# Patient Record
Sex: MALE | Race: BLACK OR AFRICAN AMERICAN
[De-identification: names, ages, dates, MRNs, and addresses within clinical notes are randomized per-mention and may not be internally consistent; named-entity substitution may affect disease eponyms.]

---

## 2018-10-06 ENCOUNTER — HOSPITAL ENCOUNTER (EMERGENCY)
Dept: HOSPITAL 9 - MADERS | Age: 28
Discharge: HOME | End: 2018-10-06
Payer: SELF-PAY

## 2018-10-06 DIAGNOSIS — J02.9: Primary | ICD-10-CM

## 2018-10-06 DIAGNOSIS — F17.210: ICD-10-CM

## 2018-10-06 PROCEDURE — 99406 BEHAV CHNG SMOKING 3-10 MIN: CPT

## 2018-10-06 PROCEDURE — 87430 STREP A AG IA: CPT

## 2018-10-06 PROCEDURE — 87081 CULTURE SCREEN ONLY: CPT

## 2019-07-25 ENCOUNTER — HOSPITAL ENCOUNTER (EMERGENCY)
Dept: HOSPITAL 9 - MADERS | Age: 29
LOS: 1 days | Discharge: HOME | End: 2019-07-26
Payer: SELF-PAY

## 2019-07-25 DIAGNOSIS — J20.9: Primary | ICD-10-CM

## 2019-07-25 DIAGNOSIS — F17.210: ICD-10-CM

## 2019-07-25 PROCEDURE — 71046 X-RAY EXAM CHEST 2 VIEWS: CPT

## 2019-07-26 NOTE — RAD
XR Chest Pa   Lat STANDARD



HISTORY: Fever cough



COMPARISON: 7/15/2008



FINDINGS: The heart size is normal. The lungs are well expanded without focal areas of consolidation,
 pneumothorax or pleural effusions.



IMPRESSION: No radiographic evidence of acute cardiopulmonary process.



Reported By: Juancarlos Gardner 

Electronically Signed:  7/26/2019 7:43 AM

## 2019-09-17 ENCOUNTER — HOSPITAL ENCOUNTER (EMERGENCY)
Dept: HOSPITAL 9 - MADERS | Age: 29
Discharge: HOME | End: 2019-09-17
Payer: SELF-PAY

## 2019-09-17 DIAGNOSIS — T78.40XA: Primary | ICD-10-CM

## 2019-09-17 DIAGNOSIS — F17.210: ICD-10-CM

## 2019-09-17 PROCEDURE — 96372 THER/PROPH/DIAG INJ SC/IM: CPT

## 2019-09-17 PROCEDURE — 96374 THER/PROPH/DIAG INJ IV PUSH: CPT

## 2019-12-31 ENCOUNTER — HOSPITAL ENCOUNTER (EMERGENCY)
Dept: HOSPITAL 9 - MADERS | Age: 29
LOS: 1 days | Discharge: HOME | End: 2020-01-01
Payer: SELF-PAY

## 2019-12-31 DIAGNOSIS — J20.9: Primary | ICD-10-CM

## 2019-12-31 DIAGNOSIS — Z71.6: ICD-10-CM

## 2019-12-31 DIAGNOSIS — F17.210: ICD-10-CM

## 2019-12-31 PROCEDURE — 99406 BEHAV CHNG SMOKING 3-10 MIN: CPT

## 2020-01-19 ENCOUNTER — HOSPITAL ENCOUNTER (EMERGENCY)
Dept: HOSPITAL 9 - MADERS | Age: 30
Discharge: HOME | End: 2020-01-19
Payer: SELF-PAY

## 2020-01-19 DIAGNOSIS — J45.901: Primary | ICD-10-CM

## 2020-03-18 ENCOUNTER — HOSPITAL ENCOUNTER (EMERGENCY)
Dept: HOSPITAL 9 - MADERS | Age: 30
Discharge: HOME | End: 2020-03-18
Payer: SELF-PAY

## 2020-03-18 DIAGNOSIS — F17.200: ICD-10-CM

## 2020-03-18 DIAGNOSIS — J45.901: Primary | ICD-10-CM

## 2020-03-18 DIAGNOSIS — Z79.51: ICD-10-CM

## 2020-04-26 ENCOUNTER — HOSPITAL ENCOUNTER (EMERGENCY)
Dept: HOSPITAL 9 - MADERS | Age: 30
Discharge: HOME | End: 2020-04-26
Payer: SELF-PAY

## 2020-04-26 DIAGNOSIS — J45.902: Primary | ICD-10-CM

## 2020-04-26 LAB
ALBUMIN SERPL BCG-MCNC: 4.1 G/DL (ref 3.5–5)
ALP SERPL-CCNC: 69 U/L (ref 40–110)
ALT SERPL W P-5'-P-CCNC: 35 U/L (ref 8–55)
ANION GAP SERPL CALC-SCNC: 14 MMOL/L (ref 10–20)
AST SERPL-CCNC: 28 U/L (ref 5–34)
BASOPHILS # BLD AUTO: 0.1 THOU/UL (ref 0–0.2)
BASOPHILS NFR BLD AUTO: 2 % (ref 0–1)
BILIRUB SERPL-MCNC: 0.3 MG/DL (ref 0.2–1.2)
BUN SERPL-MCNC: 13 MG/DL (ref 8.9–20.6)
CALCIUM SERPL-MCNC: 9.5 MG/DL (ref 7.8–10.44)
CHLORIDE SERPL-SCNC: 106 MMOL/L (ref 98–107)
CO2 SERPL-SCNC: 25 MMOL/L (ref 22–29)
CREAT CL PREDICTED SERPL C-G-VRATE: 0 ML/MIN (ref 70–130)
EOSINOPHIL # BLD AUTO: 0.3 THOU/UL (ref 0–0.7)
EOSINOPHIL NFR BLD AUTO: 5.2 % (ref 0–10)
GLOBULIN SER CALC-MCNC: 3.8 G/DL (ref 2.4–3.5)
GLUCOSE SERPL-MCNC: 83 MG/DL (ref 70–105)
HGB BLD-MCNC: 15.3 G/DL (ref 14–18)
LYMPHOCYTES # BLD AUTO: 2.4 THOU/UL (ref 1.2–3.4)
LYMPHOCYTES NFR BLD AUTO: 35.8 % (ref 21–51)
MCH RBC QN AUTO: 26.5 PG (ref 27–31)
MCV RBC AUTO: 89.3 FL (ref 78–98)
MONOCYTES # BLD AUTO: 0.4 THOU/UL (ref 0.11–0.59)
MONOCYTES NFR BLD AUTO: 6.7 % (ref 0–10)
NEUTROPHILS # BLD AUTO: 3.3 THOU/UL (ref 1.4–6.5)
NEUTROPHILS NFR BLD AUTO: 50.4 % (ref 42–75)
PLATELET # BLD AUTO: 175 THOU/UL (ref 130–400)
POTASSIUM SERPL-SCNC: 4.4 MMOL/L (ref 3.5–5.1)
RBC # BLD AUTO: 5.75 MILL/UL (ref 4.7–6.1)
SODIUM SERPL-SCNC: 141 MMOL/L (ref 136–145)
WBC # BLD AUTO: 6.6 THOU/UL (ref 4.8–10.8)

## 2020-04-26 PROCEDURE — 96374 THER/PROPH/DIAG INJ IV PUSH: CPT

## 2020-04-26 PROCEDURE — 80053 COMPREHEN METABOLIC PANEL: CPT

## 2020-04-26 PROCEDURE — 85025 COMPLETE CBC W/AUTO DIFF WBC: CPT

## 2020-04-26 PROCEDURE — 94760 N-INVAS EAR/PLS OXIMETRY 1: CPT

## 2020-04-26 PROCEDURE — 71045 X-RAY EXAM CHEST 1 VIEW: CPT

## 2020-04-26 PROCEDURE — 94640 AIRWAY INHALATION TREATMENT: CPT

## 2020-04-26 NOTE — RAD
Chest AP view



INDICATION: Dyspnea



COMPARISON: July 26, 2019



FINDINGS:



Lungs:  The lungs are clear



Cardiac silhouette:  The cardiomediastinal silhouette appears within normal limits.



Pulmonary vasculature:  Normal



Pleural spaces:  No pleural effusion or pneumothorax is demonstrated.



Upper abdomen:  No abnormality seen.



Osseous structures:  No acute osseous abnormality.



Additional findings:  None.



IMPRESSION: 

No acute cardiopulmonary abnormality.



Reported By: Al Alonso 

Electronically Signed:  4/26/2020 1:56 PM

## 2020-08-15 ENCOUNTER — HOSPITAL ENCOUNTER (EMERGENCY)
Dept: HOSPITAL 9 - MADERS | Age: 30
Discharge: HOME | End: 2020-08-15
Payer: SELF-PAY

## 2020-08-15 DIAGNOSIS — J45.901: Primary | ICD-10-CM

## 2020-08-15 DIAGNOSIS — Z79.51: ICD-10-CM

## 2020-08-15 PROCEDURE — 96372 THER/PROPH/DIAG INJ SC/IM: CPT

## 2020-08-15 PROCEDURE — 71045 X-RAY EXAM CHEST 1 VIEW: CPT

## 2020-08-15 NOTE — RAD
CHEST ONE VIEW:

 

History: 

Dyspnea 

 

Comparison: 

4-

 

FINDINGS: 

Heart size is normal. The lungs are clear. No pneumonia, edema, or pleural effusion. 

 

IMPRESSION:

No significant acute intrathoracic disease. 

 

 

POS: OFF

## 2020-09-13 ENCOUNTER — HOSPITAL ENCOUNTER (EMERGENCY)
Dept: HOSPITAL 9 - MADERS | Age: 30
Discharge: HOME | End: 2020-09-13
Payer: SELF-PAY

## 2020-09-13 DIAGNOSIS — F17.210: ICD-10-CM

## 2020-09-13 DIAGNOSIS — J45.901: Primary | ICD-10-CM

## 2020-09-13 DIAGNOSIS — Z79.899: ICD-10-CM

## 2020-10-08 ENCOUNTER — HOSPITAL ENCOUNTER (EMERGENCY)
Dept: HOSPITAL 9 - MADERS | Age: 30
Discharge: HOME | End: 2020-10-08
Payer: SELF-PAY

## 2020-10-08 DIAGNOSIS — Z53.21: Primary | ICD-10-CM

## 2020-10-08 PROCEDURE — 71046 X-RAY EXAM CHEST 2 VIEWS: CPT

## 2020-10-08 NOTE — RAD
CHEST 2 VIEWS:

 

HISTORY: 

Asthma attack.

 

COMPARISON: 

Radiograph 8/15/2020.

 

FINDINGS: 

Lungs are clear.  No pneumothorax or effusion.  Cardiac silhouette and mediastinal contours are withi
n normal limits.  No acute osseous abnormality.

 

IMPRESSION: 

No acute intrathoracic abnormality.

 

POS: [facility]

## 2020-11-01 ENCOUNTER — HOSPITAL ENCOUNTER (EMERGENCY)
Dept: HOSPITAL 9 - MADERS | Age: 30
Discharge: HOME | End: 2020-11-01
Payer: SELF-PAY

## 2020-11-01 DIAGNOSIS — K02.9: ICD-10-CM

## 2020-11-01 DIAGNOSIS — K04.7: Primary | ICD-10-CM

## 2020-11-01 DIAGNOSIS — J45.909: ICD-10-CM

## 2020-11-01 DIAGNOSIS — F17.210: ICD-10-CM

## 2020-11-01 DIAGNOSIS — Z71.6: ICD-10-CM

## 2020-11-01 PROCEDURE — 99406 BEHAV CHNG SMOKING 3-10 MIN: CPT
